# Patient Record
Sex: FEMALE | Race: BLACK OR AFRICAN AMERICAN | NOT HISPANIC OR LATINO | Employment: UNEMPLOYED | URBAN - METROPOLITAN AREA
[De-identification: names, ages, dates, MRNs, and addresses within clinical notes are randomized per-mention and may not be internally consistent; named-entity substitution may affect disease eponyms.]

---

## 2019-07-19 PROBLEM — F25.0 SCHIZOAFFECTIVE DISORDER, BIPOLAR TYPE: Status: ACTIVE | Noted: 2019-07-19

## 2020-12-18 ENCOUNTER — SOCIAL WORK (OUTPATIENT)
Dept: ADMINISTRATIVE | Facility: OTHER | Age: 35
End: 2020-12-18

## 2020-12-18 NOTE — PROGRESS NOTES
SW met with pt regarding initial OB assessment. Pt stated this is her 2nd pregnancy/0-miscarriage. Pt stated lives with her boyfriend/child-4 and able to perform ADL's independently. Pt stated support system is her sister/Eva. Pt stated has medicaid([employer]). Pt stated does not have WIC Pt stated is going to breastfeed. SW provide pt with information on other community resources.SW faxed and scanned pt's notification of pregnancy into epic.  No other needs identified at this time.    Yee Monae,MSW  Pager#[phone]

## 2020-12-30 PROBLEM — Z98.891 HISTORY OF CESAREAN SECTION: Status: ACTIVE | Noted: 2020-12-30

## 2020-12-30 PROBLEM — O09.90 SUPERVISION OF HIGH RISK PREGNANCY, ANTEPARTUM: Status: ACTIVE | Noted: 2020-12-30

## 2021-01-27 ENCOUNTER — PATIENT MESSAGE (OUTPATIENT)
Dept: ADMINISTRATIVE | Facility: OTHER | Age: 36
End: 2021-01-27

## 2021-02-03 ENCOUNTER — PATIENT MESSAGE (OUTPATIENT)
Dept: ADMINISTRATIVE | Facility: OTHER | Age: 36
End: 2021-02-03

## 2021-04-28 ENCOUNTER — PATIENT MESSAGE (OUTPATIENT)
Dept: ADMINISTRATIVE | Facility: OTHER | Age: 36
End: 2021-04-28

## 2021-05-10 PROBLEM — Z87.59 HISTORY OF PRIOR PREGNANCY WITH IUGR NEWBORN: Status: ACTIVE | Noted: 2021-05-10

## 2021-05-10 PROBLEM — Z3A.26 26 WEEKS GESTATION OF PREGNANCY: Status: ACTIVE | Noted: 2021-05-10

## 2021-05-10 PROBLEM — O99.119 THROMBOCYTOPENIA AFFECTING PREGNANCY: Status: ACTIVE | Noted: 2021-05-10
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
## 2021-05-10 PROBLEM — D69.6 THROMBOCYTOPENIA AFFECTING PREGNANCY: Status: ACTIVE | Noted: 2021-05-10

## 2021-05-12 ENCOUNTER — PATIENT MESSAGE (OUTPATIENT)
Dept: RESEARCH | Facility: HOSPITAL | Age: 36
End: 2021-05-12

## 2021-05-24 PROBLEM — Z3A.28 28 WEEKS GESTATION OF PREGNANCY: Status: ACTIVE | Noted: 2021-05-10

## 2021-06-18 PROBLEM — Z30.09 UNWANTED FERTILITY: Status: ACTIVE | Noted: 2021-06-18

## 2021-06-18 PROBLEM — F33.3 SEVERE RECURRENT MAJOR DEPRESSION WITH PSYCHOTIC FEATURES: Status: ACTIVE | Noted: 2021-06-18

## 2021-06-18 PROBLEM — R76.8 HSV-2 SEROPOSITIVE: Status: ACTIVE | Noted: 2019-02-22

## 2021-06-18 PROBLEM — N76.0 ACUTE VAGINITIS: Status: ACTIVE | Noted: 2021-06-18

## 2021-06-18 PROBLEM — O26.899 ABDOMINAL PAIN AFFECTING PREGNANCY: Status: ACTIVE | Noted: 2021-06-18

## 2021-06-18 PROBLEM — R10.9 ABDOMINAL PAIN AFFECTING PREGNANCY: Status: ACTIVE | Noted: 2021-06-18

## 2021-06-19 PROBLEM — O99.891 ASYMPTOMATIC BACTERIURIA DURING PREGNANCY: Status: ACTIVE | Noted: 2021-06-19

## 2021-06-19 PROBLEM — B95.1 GROUP B STREPTOCOCCUS URINARY TRACT INFECTION AFFECTING PREGNANCY IN THIRD TRIMESTER: Status: ACTIVE | Noted: 2021-06-19

## 2021-06-19 PROBLEM — O23.43 GROUP B STREPTOCOCCUS URINARY TRACT INFECTION AFFECTING PREGNANCY IN THIRD TRIMESTER: Status: ACTIVE | Noted: 2021-06-19

## 2021-06-19 PROBLEM — O99.820 GROUP B STREPTOCOCCUS CARRIER STATE AFFECTING PREGNANCY: Status: ACTIVE | Noted: 2021-06-19

## 2021-06-19 PROBLEM — R82.71 ASYMPTOMATIC BACTERIURIA DURING PREGNANCY: Status: ACTIVE | Noted: 2021-06-19

## 2021-06-28 PROBLEM — O23.43 GROUP B STREPTOCOCCUS URINARY TRACT INFECTION AFFECTING PREGNANCY IN THIRD TRIMESTER: Status: ACTIVE | Noted: 2021-06-28

## 2021-06-28 PROBLEM — B95.1 GROUP B STREPTOCOCCUS URINARY TRACT INFECTION AFFECTING PREGNANCY IN THIRD TRIMESTER: Status: ACTIVE | Noted: 2021-06-28

## 2021-07-26 PROBLEM — O09.92 ENCOUNTER FOR SUPERVISION OF HIGH RISK PREGNANCY IN SECOND TRIMESTER, ANTEPARTUM: Status: ACTIVE | Noted: 2020-12-30

## 2021-08-02 PROBLEM — O41.03X0 OLIGOHYDRAMNIOS IN THIRD TRIMESTER: Status: ACTIVE | Noted: 2021-08-02

## 2021-08-03 PROBLEM — Z98.51 S/P TUBAL LIGATION: Status: ACTIVE | Noted: 2021-08-03

## 2021-08-03 PROBLEM — O99.810 ABNORMAL O'SULLIVAN GLUCOSE CHALLENGE TEST, ANTEPARTUM: Status: ACTIVE | Noted: 2021-08-03

## 2021-08-03 PROBLEM — O47.9 UTERINE CONTRACTIONS DURING PREGNANCY: Status: ACTIVE | Noted: 2021-08-03

## 2021-08-03 PROBLEM — Z98.891 S/P REPEAT LOW TRANSVERSE C-SECTION: Status: ACTIVE | Noted: 2021-08-03

## 2021-11-08 PROBLEM — O41.03X0 OLIGOHYDRAMNIOS IN THIRD TRIMESTER: Status: RESOLVED | Noted: 2021-08-02 | Resolved: 2021-11-08

## 2022-10-26 PROBLEM — O99.820 GROUP B STREPTOCOCCUS CARRIER STATE AFFECTING PREGNANCY: Status: RESOLVED | Noted: 2021-06-19 | Resolved: 2022-10-26

## 2022-10-26 PROBLEM — O09.90 SUPERVISION OF HIGH RISK PREGNANCY, ANTEPARTUM: Status: RESOLVED | Noted: 2020-12-30 | Resolved: 2022-10-26

## 2022-10-26 PROBLEM — B95.1 GROUP B STREPTOCOCCUS URINARY TRACT INFECTION AFFECTING PREGNANCY IN THIRD TRIMESTER: Status: RESOLVED | Noted: 2021-06-28 | Resolved: 2022-10-26

## 2022-10-26 PROBLEM — Z13.71 BRCA NEGATIVE: Status: ACTIVE | Noted: 2022-10-26

## 2022-10-26 PROBLEM — D69.6 THROMBOCYTOPENIA AFFECTING PREGNANCY: Status: RESOLVED | Noted: 2021-05-10 | Resolved: 2022-10-26

## 2022-10-26 PROBLEM — O23.43 GROUP B STREPTOCOCCUS URINARY TRACT INFECTION AFFECTING PREGNANCY IN THIRD TRIMESTER: Status: RESOLVED | Noted: 2021-06-28 | Resolved: 2022-10-26

## 2022-10-26 PROBLEM — O99.810 ABNORMAL O'SULLIVAN GLUCOSE CHALLENGE TEST, ANTEPARTUM: Status: RESOLVED | Noted: 2021-08-03 | Resolved: 2022-10-26

## 2022-10-26 PROBLEM — O99.119 THROMBOCYTOPENIA AFFECTING PREGNANCY: Status: RESOLVED | Noted: 2021-05-10 | Resolved: 2022-10-26

## 2022-10-26 PROBLEM — R10.9 ABDOMINAL PAIN AFFECTING PREGNANCY: Status: RESOLVED | Noted: 2021-06-18 | Resolved: 2022-10-26

## 2022-10-26 PROBLEM — O26.899 ABDOMINAL PAIN AFFECTING PREGNANCY: Status: RESOLVED | Noted: 2021-06-18 | Resolved: 2022-10-26

## 2022-10-26 PROBLEM — O47.9 UTERINE CONTRACTIONS DURING PREGNANCY: Status: RESOLVED | Noted: 2021-08-03 | Resolved: 2022-10-26

## 2022-10-26 PROBLEM — O99.891 ASYMPTOMATIC BACTERIURIA DURING PREGNANCY: Status: RESOLVED | Noted: 2021-06-19 | Resolved: 2022-10-26

## 2022-10-26 PROBLEM — Z87.59 HISTORY OF PRIOR PREGNANCY WITH IUGR NEWBORN: Status: RESOLVED | Noted: 2021-05-10 | Resolved: 2022-10-26

## 2022-10-26 PROBLEM — R82.71 ASYMPTOMATIC BACTERIURIA DURING PREGNANCY: Status: RESOLVED | Noted: 2021-06-19 | Resolved: 2022-10-26

## 2022-10-26 PROBLEM — Z98.891 S/P REPEAT LOW TRANSVERSE C-SECTION: Status: RESOLVED | Noted: 2021-08-03 | Resolved: 2022-10-26

## 2023-01-30 PROBLEM — Z13.71 BRCA NEGATIVE: Status: RESOLVED | Noted: 2022-10-26 | Resolved: 2023-01-30
